# Patient Record
Sex: MALE | Race: WHITE
[De-identification: names, ages, dates, MRNs, and addresses within clinical notes are randomized per-mention and may not be internally consistent; named-entity substitution may affect disease eponyms.]

---

## 2021-12-29 ENCOUNTER — HOSPITAL ENCOUNTER (EMERGENCY)
Dept: HOSPITAL 43 - DL.ED | Age: 56
Discharge: LEFT BEFORE DISCHARGE | End: 2021-12-29
Payer: SELF-PAY

## 2021-12-29 DIAGNOSIS — Z20.822: ICD-10-CM

## 2021-12-29 DIAGNOSIS — R06.02: Primary | ICD-10-CM

## 2021-12-29 DIAGNOSIS — R05.9: ICD-10-CM

## 2021-12-29 LAB — SARS-COV-2 RNA RESP QL NAA+PROBE: NEGATIVE

## 2021-12-29 NOTE — EDM.PDOC
ED HPI GENERAL MEDICAL PROBLEM





- General


Chief Complaint: Respiratory Problem


Stated Complaint: POSSIBLE PNEUMONIA


Time Seen by Provider: 12/29/21 09:55


Source of Information: Reports: Patient


History Limitations: Reports: No Limitations





- History of Present Illness


INITIAL COMMENTS - FREE TEXT/NARRATIVE: 





This 55 yo male patient reports to the ED due to difficulties breathing. The 

patient reports he has had increased shortness of breath for the past 5 days. 

The patient reports his symptoms are better today then in the past 5 days. The 

patient reports he had COVID sometime in the fall. The patient reports he has 

not been seen by his primary care facility. The patient was getting frustrated 

due to having multiple questions regarding his history. The patient then 

reported he was seen here in our hospital. The patient got up and left prior to 

any additional assessment. A call was placed to our registration office (that 

created a new account for this patient). Further looking into the patient's 

name, an additional record was found. Unfortunately, the patient had left prior 

to this information being located. 


Onset Date: 12/24/21


Duration: Constant


Location: Reports: Chest


Quality: Reports: Other


Severity: Moderate


Improves with: Reports: None


Worsens with: Reports: None


Context: Reports: Other


Associated Symptoms: Reports: Cough, Shortness of Breath





ED ROS GENERAL





- Review of Systems


Review Of Systems: Unable To Obtain


Reason Not Obtained: The patient left prior to any further evaluation possible. 





ED EXAM, GENERAL





- Physical Exam


Exam: Not Obtained


Reason Not Obtained: The patient left prior to any further assessment. 





Course





- Orders/Labs/Meds


Orders: 





                               Active Orders 24 hr











 Category Date Time Status


 


 COVID-19/FLU A+B [[facility]] Urgent Lab  12/29/21 09:48 Received














Departure





- Departure


Time of Disposition: 10:20


Disposition: Against Medical Advice 07


Condition: Undetermined


Clinical Impression: 


 Left against medical advice








- Discharge Information


Care Plan Goals: 


The patient left due to frustration surrounding the patient being registered 

under a new account without locating his previous account. 





- My Orders


Last 24 Hours: 





My Active Orders





12/29/21 09:48


COVID-19/FLU A+B [[facility]] Urgent 














- Assessment/Plan


Last 24 Hours: 





My Active Orders





12/29/21 09:48


COVID-19/FLU A+B [[facility]] Urgent